# Patient Record
Sex: FEMALE | NOT HISPANIC OR LATINO | URBAN - METROPOLITAN AREA
[De-identification: names, ages, dates, MRNs, and addresses within clinical notes are randomized per-mention and may not be internally consistent; named-entity substitution may affect disease eponyms.]

---

## 2017-12-06 ENCOUNTER — NEW PATIENT (OUTPATIENT)
Dept: URBAN - METROPOLITAN AREA CLINIC 44 | Facility: CLINIC | Age: 52
End: 2017-12-06

## 2017-12-06 PROCEDURE — 92134 CPTRZ OPH DX IMG PST SGM RTA: CPT | Performed by: OPTOMETRIST

## 2017-12-06 PROCEDURE — 92015 DETERMINE REFRACTIVE STATE: CPT | Performed by: OPTOMETRIST

## 2017-12-06 PROCEDURE — 92004 COMPRE OPH EXAM NEW PT 1/>: CPT | Performed by: OPTOMETRIST

## 2017-12-06 ASSESSMENT — KERATOMETRY
OD: 44.63
OS: 45.00

## 2017-12-06 ASSESSMENT — VISUAL ACUITY
OD: 20/50
OS: 20/20

## 2017-12-06 ASSESSMENT — INTRAOCULAR PRESSURE
OS: 11
OD: 11

## 2019-06-06 ENCOUNTER — OFFICE VISIT (OUTPATIENT)
Dept: URBAN - METROPOLITAN AREA CLINIC 33 | Facility: CLINIC | Age: 54
End: 2019-06-06

## 2019-06-06 DIAGNOSIS — H53.021 REFRACTIVE AMBLYOPIA, RIGHT EYE: ICD-10-CM

## 2019-06-06 DIAGNOSIS — H52.4 PRESBYOPIA: Primary | ICD-10-CM

## 2019-06-06 PROCEDURE — 92004 COMPRE OPH EXAM NEW PT 1/>: CPT | Performed by: OPTOMETRIST

## 2019-06-06 ASSESSMENT — KERATOMETRY
OS: 45.25
OD: 44.63

## 2019-06-06 ASSESSMENT — INTRAOCULAR PRESSURE
OS: 12
OD: 11

## 2019-06-06 ASSESSMENT — VISUAL ACUITY
OS: 20/25
OD: 20/40

## 2019-06-06 NOTE — IMPRESSION/PLAN
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 Plan: Refractive amblyopia OD. Advised patient of condition. Discussed diagnosis in detail with patient. New glasses Rx was given today. Trialed frames in office, and patient was happy with new RX.

## 2019-06-06 NOTE — IMPRESSION/PLAN
Impression: Presbyopia: H52.4. Plan: Educated patient about today's findings. Order refraction to determine patient's best corrected visual acuity as it relates to presbyopia- dispensed Rx to patient. Patient was educated about 1-2 week adaptation period with new Rx. 

Trialed glasses in clinic, patient happy with new rx